# Patient Record
Sex: MALE | Race: WHITE | NOT HISPANIC OR LATINO | URBAN - METROPOLITAN AREA
[De-identification: names, ages, dates, MRNs, and addresses within clinical notes are randomized per-mention and may not be internally consistent; named-entity substitution may affect disease eponyms.]

---

## 2018-03-08 ENCOUNTER — RESOLUTE PROFESSIONAL BILLING HOSPITAL PROF FEE (OUTPATIENT)
Dept: HOSPITALIST | Facility: MEDICAL CENTER | Age: 43
End: 2018-03-08
Payer: COMMERCIAL

## 2018-03-08 ENCOUNTER — HOSPITAL ENCOUNTER (OUTPATIENT)
Dept: RADIOLOGY | Facility: MEDICAL CENTER | Age: 43
End: 2018-03-08

## 2018-03-08 ENCOUNTER — HOSPITAL ENCOUNTER (INPATIENT)
Facility: MEDICAL CENTER | Age: 43
LOS: 2 days | DRG: 280 | End: 2018-03-10
Attending: EMERGENCY MEDICINE | Admitting: HOSPITALIST
Payer: COMMERCIAL

## 2018-03-08 DIAGNOSIS — R07.9 CHEST PAIN, UNSPECIFIED TYPE: ICD-10-CM

## 2018-03-08 DIAGNOSIS — R79.89 ELEVATED TROPONIN: ICD-10-CM

## 2018-03-08 PROBLEM — J18.9 CAP (COMMUNITY ACQUIRED PNEUMONIA): Status: ACTIVE | Noted: 2018-03-08

## 2018-03-08 PROBLEM — E66.3 OVERWEIGHT: Status: ACTIVE | Noted: 2018-03-08

## 2018-03-08 PROBLEM — I21.4 NSTEMI (NON-ST ELEVATED MYOCARDIAL INFARCTION) (HCC): Status: ACTIVE | Noted: 2018-03-08

## 2018-03-08 LAB
BNP SERPL-MCNC: 44 PG/ML (ref 0–100)
EKG IMPRESSION: NORMAL
EST. AVERAGE GLUCOSE BLD GHB EST-MCNC: 97 MG/DL
HBA1C MFR BLD: 5 % (ref 0–5.6)
TROPONIN I SERPL-MCNC: 0.72 NG/ML (ref 0–0.04)

## 2018-03-08 PROCEDURE — 83036 HEMOGLOBIN GLYCOSYLATED A1C: CPT

## 2018-03-08 PROCEDURE — 99285 EMERGENCY DEPT VISIT HI MDM: CPT

## 2018-03-08 PROCEDURE — 700111 HCHG RX REV CODE 636 W/ 250 OVERRIDE (IP): Performed by: HOSPITALIST

## 2018-03-08 PROCEDURE — 83880 ASSAY OF NATRIURETIC PEPTIDE: CPT

## 2018-03-08 PROCEDURE — 36415 COLL VENOUS BLD VENIPUNCTURE: CPT

## 2018-03-08 PROCEDURE — 770020 HCHG ROOM/CARE - TELE (206)

## 2018-03-08 PROCEDURE — 93005 ELECTROCARDIOGRAM TRACING: CPT | Performed by: EMERGENCY MEDICINE

## 2018-03-08 PROCEDURE — 93005 ELECTROCARDIOGRAM TRACING: CPT

## 2018-03-08 PROCEDURE — 99223 1ST HOSP IP/OBS HIGH 75: CPT | Performed by: HOSPITALIST

## 2018-03-08 PROCEDURE — 700105 HCHG RX REV CODE 258: Performed by: HOSPITALIST

## 2018-03-08 PROCEDURE — A9270 NON-COVERED ITEM OR SERVICE: HCPCS | Performed by: EMERGENCY MEDICINE

## 2018-03-08 PROCEDURE — 700102 HCHG RX REV CODE 250 W/ 637 OVERRIDE(OP): Performed by: EMERGENCY MEDICINE

## 2018-03-08 PROCEDURE — 84484 ASSAY OF TROPONIN QUANT: CPT

## 2018-03-08 RX ORDER — ACETAMINOPHEN 325 MG/1
650 TABLET ORAL EVERY 6 HOURS PRN
Status: DISCONTINUED | OUTPATIENT
Start: 2018-03-08 | End: 2018-03-10 | Stop reason: HOSPADM

## 2018-03-08 RX ORDER — NITROGLYCERIN 0.4 MG/1
0.4 TABLET SUBLINGUAL
Status: DISCONTINUED | OUTPATIENT
Start: 2018-03-08 | End: 2018-03-10 | Stop reason: HOSPADM

## 2018-03-08 RX ORDER — CLONIDINE HYDROCHLORIDE 0.1 MG/1
0.1 TABLET ORAL EVERY 6 HOURS PRN
Status: DISCONTINUED | OUTPATIENT
Start: 2018-03-08 | End: 2018-03-10 | Stop reason: HOSPADM

## 2018-03-08 RX ORDER — ASPIRIN 300 MG/1
300 SUPPOSITORY RECTAL DAILY
Status: DISCONTINUED | OUTPATIENT
Start: 2018-03-09 | End: 2018-03-10

## 2018-03-08 RX ORDER — ASPIRIN 325 MG
325 TABLET ORAL DAILY
Status: DISCONTINUED | OUTPATIENT
Start: 2018-03-09 | End: 2018-03-10

## 2018-03-08 RX ORDER — AMOXICILLIN 250 MG
2 CAPSULE ORAL 2 TIMES DAILY
Status: DISCONTINUED | OUTPATIENT
Start: 2018-03-08 | End: 2018-03-10 | Stop reason: HOSPADM

## 2018-03-08 RX ORDER — POLYETHYLENE GLYCOL 3350 17 G/17G
1 POWDER, FOR SOLUTION ORAL
Status: DISCONTINUED | OUTPATIENT
Start: 2018-03-08 | End: 2018-03-10 | Stop reason: HOSPADM

## 2018-03-08 RX ORDER — BISACODYL 10 MG
10 SUPPOSITORY, RECTAL RECTAL
Status: DISCONTINUED | OUTPATIENT
Start: 2018-03-08 | End: 2018-03-10 | Stop reason: HOSPADM

## 2018-03-08 RX ORDER — PROMETHAZINE HYDROCHLORIDE 25 MG/1
12.5-25 TABLET ORAL EVERY 4 HOURS PRN
Status: DISCONTINUED | OUTPATIENT
Start: 2018-03-08 | End: 2018-03-10 | Stop reason: HOSPADM

## 2018-03-08 RX ORDER — ONDANSETRON 4 MG/1
4 TABLET, ORALLY DISINTEGRATING ORAL EVERY 4 HOURS PRN
Status: DISCONTINUED | OUTPATIENT
Start: 2018-03-08 | End: 2018-03-10 | Stop reason: HOSPADM

## 2018-03-08 RX ORDER — MORPHINE SULFATE 4 MG/ML
2-4 INJECTION, SOLUTION INTRAMUSCULAR; INTRAVENOUS
Status: DISCONTINUED | OUTPATIENT
Start: 2018-03-08 | End: 2018-03-10 | Stop reason: HOSPADM

## 2018-03-08 RX ORDER — PROMETHAZINE HYDROCHLORIDE 25 MG/1
12.5-25 SUPPOSITORY RECTAL EVERY 4 HOURS PRN
Status: DISCONTINUED | OUTPATIENT
Start: 2018-03-08 | End: 2018-03-10 | Stop reason: HOSPADM

## 2018-03-08 RX ORDER — ONDANSETRON 2 MG/ML
4 INJECTION INTRAMUSCULAR; INTRAVENOUS EVERY 4 HOURS PRN
Status: DISCONTINUED | OUTPATIENT
Start: 2018-03-08 | End: 2018-03-10 | Stop reason: HOSPADM

## 2018-03-08 RX ORDER — SODIUM CHLORIDE 9 MG/ML
INJECTION, SOLUTION INTRAVENOUS CONTINUOUS
Status: DISCONTINUED | OUTPATIENT
Start: 2018-03-08 | End: 2018-03-10

## 2018-03-08 RX ORDER — ASPIRIN 81 MG/1
324 TABLET, CHEWABLE ORAL DAILY
Status: DISCONTINUED | OUTPATIENT
Start: 2018-03-09 | End: 2018-03-10

## 2018-03-08 RX ORDER — ATORVASTATIN CALCIUM 40 MG/1
40 TABLET, FILM COATED ORAL EVERY EVENING
Status: DISCONTINUED | OUTPATIENT
Start: 2018-03-08 | End: 2018-03-10

## 2018-03-08 RX ORDER — ZOLPIDEM TARTRATE 5 MG/1
5 TABLET ORAL
Status: DISCONTINUED | OUTPATIENT
Start: 2018-03-08 | End: 2018-03-10 | Stop reason: HOSPADM

## 2018-03-08 RX ORDER — VALACYCLOVIR HYDROCHLORIDE 500 MG/1
500 TABLET, FILM COATED ORAL 2 TIMES DAILY
COMMUNITY
Start: 2018-03-06

## 2018-03-08 RX ORDER — LEVOFLOXACIN 5 MG/ML
750 INJECTION, SOLUTION INTRAVENOUS EVERY 24 HOURS
Status: DISCONTINUED | OUTPATIENT
Start: 2018-03-09 | End: 2018-03-09

## 2018-03-08 RX ORDER — LISINOPRIL 5 MG/1
5 TABLET ORAL
Status: DISCONTINUED | OUTPATIENT
Start: 2018-03-09 | End: 2018-03-10 | Stop reason: HOSPADM

## 2018-03-08 RX ADMIN — NITROGLYCERIN 1 INCH: 20 OINTMENT TOPICAL at 19:42

## 2018-03-08 RX ADMIN — SODIUM CHLORIDE: 9 INJECTION, SOLUTION INTRAVENOUS at 23:34

## 2018-03-08 RX ADMIN — MORPHINE SULFATE 4 MG: 4 INJECTION INTRAVENOUS at 23:59

## 2018-03-08 ASSESSMENT — LIFESTYLE VARIABLES
ON A TYPICAL DAY WHEN YOU DRINK ALCOHOL HOW MANY DRINKS DO YOU HAVE: 2
EVER HAD A DRINK FIRST THING IN THE MORNING TO STEADY YOUR NERVES TO GET RID OF A HANGOVER: NO
CONSUMPTION TOTAL: NEGATIVE
HOW MANY TIMES IN THE PAST YEAR HAVE YOU HAD 5 OR MORE DRINKS IN A DAY: 0
DO YOU DRINK ALCOHOL: YES
HAVE PEOPLE ANNOYED YOU BY CRITICIZING YOUR DRINKING: NO
HAVE YOU EVER FELT YOU SHOULD CUT DOWN ON YOUR DRINKING: NO
EVER_SMOKED: YES
AVERAGE NUMBER OF DAYS PER WEEK YOU HAVE A DRINK CONTAINING ALCOHOL: 2
TOTAL SCORE: 0
TOTAL SCORE: 0
EVER FELT BAD OR GUILTY ABOUT YOUR DRINKING: NO
TOTAL SCORE: 0

## 2018-03-08 ASSESSMENT — COGNITIVE AND FUNCTIONAL STATUS - GENERAL
MOBILITY SCORE: 23
SUGGESTED CMS G CODE MODIFIER DAILY ACTIVITY: CH
DAILY ACTIVITIY SCORE: 24
SUGGESTED CMS G CODE MODIFIER MOBILITY: CI
CLIMB 3 TO 5 STEPS WITH RAILING: A LITTLE

## 2018-03-08 ASSESSMENT — PATIENT HEALTH QUESTIONNAIRE - PHQ9
1. LITTLE INTEREST OR PLEASURE IN DOING THINGS: NOT AT ALL
SUM OF ALL RESPONSES TO PHQ QUESTIONS 1-9: 0
2. FEELING DOWN, DEPRESSED, IRRITABLE, OR HOPELESS: NOT AT ALL
SUM OF ALL RESPONSES TO PHQ9 QUESTIONS 1 AND 2: 0

## 2018-03-08 ASSESSMENT — ENCOUNTER SYMPTOMS
GASTROINTESTINAL NEGATIVE: 1
SHORTNESS OF BREATH: 1
PSYCHIATRIC NEGATIVE: 1
EYES NEGATIVE: 1
CONSTITUTIONAL NEGATIVE: 1
NEUROLOGICAL NEGATIVE: 1
MUSCULOSKELETAL NEGATIVE: 1

## 2018-03-08 ASSESSMENT — PAIN SCALES - GENERAL
PAINLEVEL_OUTOF10: 0
PAINLEVEL_OUTOF10: 6

## 2018-03-09 ENCOUNTER — APPOINTMENT (OUTPATIENT)
Dept: RADIOLOGY | Facility: MEDICAL CENTER | Age: 43
DRG: 280 | End: 2018-03-09
Attending: HOSPITALIST
Payer: COMMERCIAL

## 2018-03-09 LAB
ANION GAP SERPL CALC-SCNC: 8 MMOL/L (ref 0–11.9)
BASOPHILS # BLD AUTO: 0.5 % (ref 0–1.8)
BASOPHILS # BLD: 0.05 K/UL (ref 0–0.12)
BUN SERPL-MCNC: 11 MG/DL (ref 8–22)
CALCIUM SERPL-MCNC: 8.8 MG/DL (ref 8.5–10.5)
CHLORIDE SERPL-SCNC: 108 MMOL/L (ref 96–112)
CHOLEST SERPL-MCNC: 150 MG/DL (ref 100–199)
CO2 SERPL-SCNC: 21 MMOL/L (ref 20–33)
CREAT SERPL-MCNC: 0.97 MG/DL (ref 0.5–1.4)
EKG IMPRESSION: NORMAL
EOSINOPHIL # BLD AUTO: 0.03 K/UL (ref 0–0.51)
EOSINOPHIL NFR BLD: 0.3 % (ref 0–6.9)
ERYTHROCYTE [DISTWIDTH] IN BLOOD BY AUTOMATED COUNT: 45 FL (ref 35.9–50)
GLUCOSE SERPL-MCNC: 106 MG/DL (ref 65–99)
HCT VFR BLD AUTO: 39.1 % (ref 42–52)
HDLC SERPL-MCNC: 19 MG/DL
HGB BLD-MCNC: 13 G/DL (ref 14–18)
IMM GRANULOCYTES # BLD AUTO: 0.1 K/UL (ref 0–0.11)
IMM GRANULOCYTES NFR BLD AUTO: 1 % (ref 0–0.9)
LDLC SERPL CALC-MCNC: 98 MG/DL
LYMPHOCYTES # BLD AUTO: 1.36 K/UL (ref 1–4.8)
LYMPHOCYTES NFR BLD: 13 % (ref 22–41)
MCH RBC QN AUTO: 31.3 PG (ref 27–33)
MCHC RBC AUTO-ENTMCNC: 33.2 G/DL (ref 33.7–35.3)
MCV RBC AUTO: 94 FL (ref 81.4–97.8)
MONOCYTES # BLD AUTO: 0.63 K/UL (ref 0–0.85)
MONOCYTES NFR BLD AUTO: 6 % (ref 0–13.4)
NEUTROPHILS # BLD AUTO: 8.29 K/UL (ref 1.82–7.42)
NEUTROPHILS NFR BLD: 79.2 % (ref 44–72)
NRBC # BLD AUTO: 0 K/UL
NRBC BLD-RTO: 0 /100 WBC
PLATELET # BLD AUTO: 249 K/UL (ref 164–446)
PMV BLD AUTO: 9.9 FL (ref 9–12.9)
POTASSIUM SERPL-SCNC: 4.1 MMOL/L (ref 3.6–5.5)
PROCALCITONIN SERPL-MCNC: 0.26 NG/ML
RBC # BLD AUTO: 4.16 M/UL (ref 4.7–6.1)
SODIUM SERPL-SCNC: 137 MMOL/L (ref 135–145)
TRIGL SERPL-MCNC: 165 MG/DL (ref 0–149)
TROPONIN I SERPL-MCNC: 0.29 NG/ML (ref 0–0.04)
TROPONIN I SERPL-MCNC: 0.45 NG/ML (ref 0–0.04)
WBC # BLD AUTO: 10.5 K/UL (ref 4.8–10.8)

## 2018-03-09 PROCEDURE — 99232 SBSQ HOSP IP/OBS MODERATE 35: CPT | Mod: GC | Performed by: INTERNAL MEDICINE

## 2018-03-09 PROCEDURE — 80061 LIPID PANEL: CPT

## 2018-03-09 PROCEDURE — 700102 HCHG RX REV CODE 250 W/ 637 OVERRIDE(OP): Performed by: HOSPITALIST

## 2018-03-09 PROCEDURE — 93005 ELECTROCARDIOGRAM TRACING: CPT | Performed by: INTERNAL MEDICINE

## 2018-03-09 PROCEDURE — 80048 BASIC METABOLIC PNL TOTAL CA: CPT

## 2018-03-09 PROCEDURE — 36415 COLL VENOUS BLD VENIPUNCTURE: CPT

## 2018-03-09 PROCEDURE — 84145 PROCALCITONIN (PCT): CPT

## 2018-03-09 PROCEDURE — 93306 TTE W/DOPPLER COMPLETE: CPT

## 2018-03-09 PROCEDURE — A9270 NON-COVERED ITEM OR SERVICE: HCPCS | Performed by: HOSPITALIST

## 2018-03-09 PROCEDURE — 770020 HCHG ROOM/CARE - TELE (206)

## 2018-03-09 PROCEDURE — 99233 SBSQ HOSP IP/OBS HIGH 50: CPT | Performed by: HOSPITALIST

## 2018-03-09 PROCEDURE — 84484 ASSAY OF TROPONIN QUANT: CPT | Mod: 91

## 2018-03-09 PROCEDURE — 85025 COMPLETE CBC W/AUTO DIFF WBC: CPT

## 2018-03-09 PROCEDURE — A9502 TC99M TETROFOSMIN: HCPCS

## 2018-03-09 PROCEDURE — 700111 HCHG RX REV CODE 636 W/ 250 OVERRIDE (IP)

## 2018-03-09 PROCEDURE — 700111 HCHG RX REV CODE 636 W/ 250 OVERRIDE (IP): Performed by: HOSPITALIST

## 2018-03-09 PROCEDURE — 700105 HCHG RX REV CODE 258: Performed by: HOSPITALIST

## 2018-03-09 PROCEDURE — 93306 TTE W/DOPPLER COMPLETE: CPT | Mod: 26 | Performed by: INTERNAL MEDICINE

## 2018-03-09 PROCEDURE — 93010 ELECTROCARDIOGRAM REPORT: CPT | Performed by: INTERNAL MEDICINE

## 2018-03-09 RX ORDER — REGADENOSON 0.08 MG/ML
INJECTION, SOLUTION INTRAVENOUS
Status: COMPLETED
Start: 2018-03-09 | End: 2018-03-09

## 2018-03-09 RX ORDER — LEVOFLOXACIN 750 MG/1
750 TABLET, FILM COATED ORAL DAILY
Status: DISCONTINUED | OUTPATIENT
Start: 2018-03-09 | End: 2018-03-10 | Stop reason: HOSPADM

## 2018-03-09 RX ADMIN — SODIUM CHLORIDE: 9 INJECTION, SOLUTION INTRAVENOUS at 11:23

## 2018-03-09 RX ADMIN — ENOXAPARIN SODIUM 40 MG: 100 INJECTION SUBCUTANEOUS at 08:19

## 2018-03-09 RX ADMIN — LISINOPRIL 5 MG: 5 TABLET ORAL at 08:19

## 2018-03-09 RX ADMIN — ATORVASTATIN CALCIUM 40 MG: 40 TABLET, FILM COATED ORAL at 20:34

## 2018-03-09 RX ADMIN — METOPROLOL TARTRATE 25 MG: 25 TABLET, FILM COATED ORAL at 20:34

## 2018-03-09 RX ADMIN — METOPROLOL TARTRATE 25 MG: 25 TABLET, FILM COATED ORAL at 08:20

## 2018-03-09 RX ADMIN — REGADENOSON 0.4 MG: 0.08 INJECTION, SOLUTION INTRAVENOUS at 13:56

## 2018-03-09 RX ADMIN — ASPIRIN 325 MG: 325 TABLET ORAL at 08:19

## 2018-03-09 RX ADMIN — LEVOFLOXACIN 750 MG: 750 TABLET, FILM COATED ORAL at 08:19

## 2018-03-09 ASSESSMENT — ENCOUNTER SYMPTOMS
SHORTNESS OF BREATH: 0
BACK PAIN: 0
BRUISES/BLEEDS EASILY: 0
FLANK PAIN: 0
ORTHOPNEA: 0
CONSTITUTIONAL NEGATIVE: 1
COUGH: 0
SHORTNESS OF BREATH: 1
NERVOUS/ANXIOUS: 0
PALPITATIONS: 0
FEVER: 0
NAUSEA: 0
DEPRESSION: 0
DIZZINESS: 0
WEIGHT LOSS: 0
GASTROINTESTINAL NEGATIVE: 1
VOMITING: 0
MYALGIAS: 0
DIAPHORESIS: 0
ABDOMINAL PAIN: 0
PSYCHIATRIC NEGATIVE: 1
SPUTUM PRODUCTION: 0
CARDIOVASCULAR NEGATIVE: 1
LOSS OF CONSCIOUSNESS: 0
NEUROLOGICAL NEGATIVE: 1
CHILLS: 0
PND: 0
MUSCULOSKELETAL NEGATIVE: 1
WEAKNESS: 0

## 2018-03-09 ASSESSMENT — PAIN SCALES - GENERAL
PAINLEVEL_OUTOF10: 2
PAINLEVEL_OUTOF10: 0

## 2018-03-09 NOTE — ED NOTES
Called and spoke with lab regarding pt's labs not resulting yet. Was told it may take another 30 minutes.

## 2018-03-09 NOTE — CONSULTS
DATE OF SERVICE:  03/08/2018    REFERRING PHYSICIAN:  Dr. Sergei Bar, Dr. Farhan Estrada.    REASON FOR CONSULT:  Atypical chest pain, positive troponins.    HISTORY OF PRESENT ILLNESS:  Pleasant 42-year-old white male who was   transferred down from [facility] with atypical chest pain,   described as sharp, infraclavicular near the left side and mild shortness of   breath.  CT scan up in [place] showed that the great vessels were normal.    There was no evidence of pulmonary embolus.  Patient was recently in [place],   had pneumonia, received antibiotics, he also has some atypical chest pain   there, subsequently came back to [place] and had the chest pain today, had   fevers and chills while he was in [place], this resolved with antibiotics.  He   denies hypertension, denies diabetes.  There is some heart disease in the   family.  Some slight excess alcohol use and no smoking.    ALLERGIES:  HE HAS ALLERGIES TO PENICILLIN AND VANCOMYCIN.    OUTPATIENT MEDICATIONS:  Include Valtrex.  He was also on recent antibiotics.    SOCIAL HISTORY:  The patient is , works in real estate, no smoking,   some slight excess alcohol use.    PAST SURGICAL HISTORY:  None.    REVIEW OF SYSTEMS:  CONSTITUTIONAL:  Some fevers recently.  ENT:  Negative.  EYES:  Negative.  RESPIRATORY:  Some mild shortness of breath.  CARDIOVASCULAR:  Atypical chest pain.  GASTROINTESTINAL:  Negative.  GENITOURINARY:  Negative.  MUSCULOSKELETAL:  Negative.  SKIN:  Negative.  NEUROLOGICAL:  Negative.  ENDOCRINE:  Negative.  HEMATOLOGIC:  Negative.  ALLERGIES:  Negative.  PSYCHIATRIC/BEHAVIORAL:  Negative.    PHYSICAL EXAMINATION:  GENERAL:  White male, in no acute distress.  VITAL SIGNS:  Blood pressure 134/97, pulse is 95, respirations 16, he is   afebrile.  CONSTITUTIONAL:  He is alert and oriented x3.  HEENT:  Head is normocephalic, atraumatic.  Eyes, pupils equal, round,   reactive to light and accommodation.  NECK:  Supple, normal  range.  CARDIOVASCULAR:  Regular rate and rhythm, normal S1, S2, without murmurs, rubs   or gallops.  PULMONARY:  Few crackles, otherwise clear.  ABDOMEN:  Soft, nontender without hepatosplenomegaly.  MUSCULOSKELETAL:  Normal range of motion.  LYMPHADENOPATHY:  None.  NEUROLOGIC:  Alert and oriented x3.  SKIN:  Turgor is normal.  PSYCHIATRIC:  Normal.    LABORATORY DATA:  Troponin was 0.72.  BNP of 44.  Electrocardiogram shows   sinus rhythm, some artifact inferiorly, otherwise a fairly unremarkable EKG,   EKG from [facility] also was unremarkable.  Labs from [facility] showed   hematocrit of 43.9, platelet count 272,000, white count 6.7, hemoglobin 15.    CMP unremarkable except for glucose of 119.  Troponin 0.75.  BNP 62.  Chest   x-ray shows small left lower lobe pleural effusion, left lower lobe   atelectasis versus pneumonia, some ground glass opacities, mild cardiomegaly   without pericardial effusion, no evidence of pulmonary embolus.    ASSESSMENT:  Chest pain, very atypical, sharp, worse with inspiration with   borderline troponins of 0.7.    RECOMMENDATIONS:  1.  Continuing troponin levels, noninvasive workup including possible   Persantine thallium in the morning.  2.  Recent pneumonia.  3.  Atypical chest pain.  4.  Borderline troponins.  5.  Check echocardiogram.       ____________________________________     KEIRA MACK MD    SHEREE / NTS    DD:  03/08/2018 22:33:31  DT:  03/09/2018 01:43:37    D#:  [id number]  Job#:  [id number]

## 2018-03-09 NOTE — PROGRESS NOTES
Pt arrived to room 837 via gurney from emergency room. Pt oriented to room and use of call light, pt verbalized understanding.     Pt A&O x4. Pt currently on room air. Pt c/o pain to left shoulder, see mar for details. Assessment completed.

## 2018-03-09 NOTE — ED PROVIDER NOTES
"ED Provider Note    Scribed for Farhan Estrada M.D. by Loi Wadsworth. 3/8/2018  7:27 PM    Primary care provider: No primary care provider on file.  Means of arrival: Ambulance  History obtained from: Patient  History limited by: None    CHIEF COMPLAINT  Chief Complaint   Patient presents with   • Chest Pain   • Abnormal Labs       HPI  Sriram Carlton is a 42 y.o. male who presents to the Emergency Department as a transfer from [facility] with complaints of chest pain pain onset 7 hours ago. The patient states he was at his office today and he had a sharp pain underneath his left clavicle. He admits that he \"felt like he broke his shoulder\". He states this pain was of sudden onset and did not experience any other symptoms. Patient also notes he was recently in [place] on a ski trip last Saturday when he felt a sharp pain around his left rib region. The patient developed a sudden fever after this rib pain and had a change in his appetite. He was later seen for these symptoms where a chest x-ray was performed which showed he had pneumonia. He was treated for this and was later released. He had no other symptoms until this afternoon. Patient denies any alleviating factors. He denies shortness of breath, fevers, chills, nausea.     REVIEW OF SYSTEMS  Pertinent positives include chest pain. Pertinent negatives include no shortness of breath, fevers, chills, nausea.  All other systems reviewed and negative.     C.     PAST MEDICAL HISTORY   Recent history of pneumonia.     SURGICAL HISTORY  patient denies any surgical history    SOCIAL HISTORY  Social History   Substance Use Topics   • Smoking status: None noted   • Smokeless tobacco: None noted   • Alcohol use None noted      History   Drug use: None noted       FAMILY HISTORY  No family history noted      CURRENT MEDICATIONS  Home Medications     Reviewed by Layla Cyr, Pharmacy Intern (Pharmacy Intern) on 03/08/18 at 2032  Med List Status: Partial " "  Medication Last Dose Status   valACYclovir (VALTREX) 500 MG Tab 3/8/2018 Active                ALLERGIES  Allergies   Allergen Reactions   • Penicillin G      Childhood rxn   • Vancomycin Hives and Rash     Unspecified       PHYSICAL EXAM  VITAL SIGNS: /97   Pulse 96   Temp 36.3 °C (97.3 °F)   Resp 16   Ht 1.88 m (6' 2\")   Wt 99.8 kg (220 lb)   SpO2 94%   BMI 28.25 kg/m²     Constitutional: Well developed, Well nourished, mild distress, Non-toxic appearance.   HENT: Normocephalic, Atraumatic, Bilateral external ears normal, Oropharynx moist, No oral exudates.   Eyes: PERRLA, EOMI, Conjunctiva normal, No discharge.   Neck: No tenderness, Supple, No stridor.   Lymphatic: No lymphadenopathy noted.   Cardiovascular: Normal heart rate, Normal rhythm.   Thorax & Lungs: Clear to auscultation bilaterally, No respiratory distress, No wheezing, No crackles.   Abdomen: Soft, No tenderness, No masses, No pulsatile masses.   Skin: Warm, Dry, No erythema, No rash.   Extremities:, No edema No cyanosis.   Musculoskeletal: No major deformities noted.  Intact distal pulses. Slight tenderness to palpation on the superior and anterior chest wall  Neurologic: Awake, alert. Moves all extremities spontaneously.  Psychiatric: Affect normal, Judgment normal, Mood normal.     LABS  Results for orders placed or performed during the hospital encounter of 03/08/18   Troponin STAT   Result Value Ref Range    Troponin I 0.72 (H) 0.00 - 0.04 ng/mL   Btype Natriuretic Peptide   Result Value Ref Range    B Natriuretic Peptide 44 0 - 100 pg/mL   Hemoglobin A1c   Result Value Ref Range    Glycohemoglobin 5.0 0.0 - 5.6 %    Est Avg Glucose 97 mg/dL   EKG (NOW)   Result Value Ref Range    Report       [facility] Emergency [facility]    Test Date:  2018-03-08  Pt Name:    KEIRA MEJIA                Department: ER  MRN:        [id number]                      Room:       RD 10  Gender:     Male                         " Technician: [id number]  :        1975                   Requested By:ER TRIAGE PROTOCOL  Order #:    [id number]                    Reading MD: EMMA BRADY MD    Measurements  Intervals                                Axis  Rate:       88                           P:          23  VA:         156                          QRS:        8  QRSD:       92                           T:          43  QT:         380  QTc:        460    Interpretive Statements  SINUS RHYTHM  No previous ECG available for comparison    Electronically Signed On 3-8-2018 19:38:53 PST by EMMA BRADY MD         RADIOLOGY  CT-FOREIGN FILM CAT SCAN   Final Result      LE Venous Duplex-DVT ([facility] only)    (Results Pending)   Echocardiogram Comp W/O Cont    (Results Pending)     The radiologist's interpretation of all radiological studies have been reviewed by me.    COURSE & MEDICAL DECISION MAKING  Pertinent Labs & Imaging studies reviewed. (See chart for details)    I reviewed the patient's medical records which showed that the patient is a transfer from [facility] for shoulder pain and chest pain pleuritic. They ordered 2 EKGs which were non-specific. The patient has a troponin level of 0.75. A repeat troponin was 0.88. They performed a CT/PE study which did not show a PE. He did have a proximal left lower lobe pneumonia. He was treated with Levaquin for 5 days for this pneumonia.     7:27 PM - Patient seen and examined at bedside. Patient will be treated with Nitro-BID ointment 1 Inch. Ordered Troponin STAT, BNP, EKG to evaluate his symptoms.     9:20 PM Paged Dr. Bar (hospitalist).     10:00 PM  I discussed the patient's case and the above findings with Dr. Bar (hospitalist) who would like to further consult for admission.     10:15 PM I discussed the patient's case and the above findings with Dr. Stone (Cardiologist) who agrees to admit the patient.       Decision Making:  Patient with moderately  elevated troponin, left sided chest pain, sounds like a pulmonary embolism however CT scan was negative, the patient received Lovenox, discussed the case with the hospitalist, they've recommended a consult with cardiologist.    DISPOSITION:  Patient will be admitted to Dr. Bar in guarded condition.      FINAL IMPRESSION  1. Chest pain, unspecified type    2. Elevated troponin          I, Loi Wadsworth (Scribe), am scribing for, and in the presence of, Farhan Estrada M.D..    Electronically signed by: Loi Wadsworth (Scribe), 3/8/2018    I, Farhan Estrada M.D. personally performed the services described in this documentation, as scribed by Loi Wadsworth in my presence, and it is both accurate and complete.    The note accurately reflects work and decisions made by me.  Farhan Estrada  3/8/2018  11:26 PM

## 2018-03-09 NOTE — ED TRIAGE NOTES
Pt arrived via EMS, transfer from [facility]. Per EMS pt presented to ED today c/o upper L chest pain radiating to shoulder. Pt described pain as sharp, stabbing. Pt also states SOB with cp.     Pt was dx with PNA on Sunday, placed on abx. Pt states last abx was taken today. Initial trop 0.77, increased to 0.88 at second lab draw. CTA of chest shows possible PNA.     Pt was given 1 g Rocephin IV, 100 mg lovenox, 325 mg ASA PO, 30 mg IV toradol, and approx. 1700 cc NS PTA.     Pt denies pain at this time. A/o x4, speaking in full sentences.

## 2018-03-09 NOTE — ASSESSMENT & PLAN NOTE
Status post 5 days of antibiotic therapy.  Pro calcitonin remains elevated.  He is starting to feel better and clinically improve back on the antibiotics. Continue the oral levofloxacin and monitor clinically.  Physiological stress from the pneumonia may have caused the increase in the troponin and perhaps may have caused a Takotsubo type syndrome.

## 2018-03-09 NOTE — PROGRESS NOTES
Skin assessment completed with 2 nurses Tiara and Julia SIMPSON.    Pt has bruise to right foot great toe. Pt hit toe during ski outing per pt.    Coccyx without redness, skin intact.  No other skin issues.

## 2018-03-09 NOTE — H&P
Hospital Medicine History and Physical    Date of Service  3/8/2018    Chief Complaint  Chief Complaint   Patient presents with   • Chest Pain   • Abnormal Labs       History of Presenting Illness  42 y.o. male without prior medical history who was in his usual state of health until approximately one week prior to admission. While in [place] on a skiing trip, he developed shortness of breath, and was evaluated at the clinic, diagnosed with pneumonia. He was subsequently started on levofloxacin, which reversed all of his symptoms. On the day of admission, however, he developed severe 8 out of 10 sharp pain to his left chest. This was nonradiating, and not associated with exacerbating or relieving factors. He then presented to a hospital in [place], where he was given pain medication with some relief of the pain. He was noted to have elevated troponin at that time, and was subsequently transferred to this facility for further evaluation. He currently reports that his chest pain is ongoing, and now associated with shortness of breath. He feels that is worsening.    Primary Care Physician  Pcp Not In Computer    Consultants  Cardiology    Code Status  Full code    Review of Systems  Review of Systems   Constitutional: Negative.    HENT: Negative.    Eyes: Negative.    Respiratory: Positive for shortness of breath.    Cardiovascular: Positive for chest pain.   Gastrointestinal: Negative.    Genitourinary: Negative.    Musculoskeletal: Negative.    Skin: Negative.    Neurological: Negative.    Endo/Heme/Allergies: Negative.    Psychiatric/Behavioral: Negative.         Past Medical History  No past medical history on file.    Surgical History  No past surgical history on file.    Medications  No current facility-administered medications on file prior to encounter.      No current outpatient prescriptions on file prior to encounter.       Family History  Family History   Problem Relation Age of Onset   • Heart Disease  Father    • Hypertension Father        Social History  Social History   Substance Use Topics   • Smoking status: Never Smoker   • Smokeless tobacco: Never Used   • Alcohol use Yes      Comment: daily drinker        Allergies  Allergies   Allergen Reactions   • Penicillin G      Childhood rxn   • Vancomycin Hives and Rash     Unspecified        Physical Exam  Laboratory   Hemodynamics  Temp (24hrs), Av.3 °C (97.3 °F), Min:36.3 °C (97.3 °F), Max:36.3 °C (97.3 °F)   Temperature: 36.3 °C (97.3 °F)  Pulse  Av.4  Min: 92  Max: 99 Heart Rate (Monitored): 95  Blood Pressure: 134/97, NIBP: 131/88      Respiratory      Respiration: 16, Pulse Oximetry: 93 %             Physical Exam   Constitutional: He is oriented to person, place, and time. He appears well-developed and well-nourished. No distress.   HENT:   Head: Normocephalic.   Eyes: Pupils are equal, round, and reactive to light.   Neck: Normal range of motion. Neck supple. No tracheal deviation present. No thyromegaly present.   Cardiovascular: Regular rhythm and normal heart sounds.  Exam reveals no gallop and no friction rub.    No murmur heard.  Pulmonary/Chest: Effort normal and breath sounds normal. No respiratory distress. He has no wheezes.   Abdominal: Soft. Bowel sounds are normal. He exhibits no distension and no mass. There is no tenderness. There is no rebound and no guarding.   Musculoskeletal: Normal range of motion. He exhibits no edema.   Lymphadenopathy:     He has no cervical adenopathy.   Neurological: He is alert and oriented to person, place, and time. No cranial nerve deficit.   Skin: He is not diaphoretic.   Psychiatric: He has a normal mood and affect.   Nursing note and vitals reviewed.              No results for input(s): ALTSGPT, ASTSGOT, ALKPHOSPHAT, TBILIRUBIN, DBILIRUBIN, GAMMAGT, AMYLASE, LIPASE, ALB, PREALBUMIN, GLUCOSE in the last 72 hours.      Recent Labs      18   BNPBTYPENAT  44         Lab Results   Component  Value Date    TROPONINI 0.72 (H) 03/08/2018     Urinalysis:  No results found for: SPECGRAVITY, GLUCOSEUR, KETONES, NITRITE, WBCURINE, RBCURINE, BACTERIA, EPITHELCELL     Imaging  Essentially normal computed tomographic angiogram of the chest from outside facility    Assessment/Plan     I anticipate this patient will require at least two midnights for appropriate medical management, necessitating inpatient admission.    * NSTEMI (non-ST elevated myocardial infarction) (CMS-[facility])   Assessment & Plan    With elevated troponin, ongoing chest pain.  Given hypoxia and tachycardia also concern for possible PE,  However negative study at outside facility for the same.  Will trend troponin and appreciate cardiology recommendations.         Overweight   Assessment & Plan    Body mass index is 28.25 kg/m².          CAP (community acquired pneumonia)   Assessment & Plan    Status post 5 days of antibiotic therapy.  Improved.  Monitor.             VTE prophylaxis: SCD, Lovenox.

## 2018-03-09 NOTE — PROGRESS NOTES
Bedside report received, pt updated on POC including NPO status after 0800 for NM - no complaints of pain at this time, call light in reach

## 2018-03-09 NOTE — ED NOTES
Med rec partially complete by interview with patient at bedside  Pt reported that he just finished a course of abx for PNA today but unable to verify that with [facility]  Pt filled a Zpak in January, which is the last abx the pt has filled  Pt reported taking Valtrex for a cold sore starting on 3/6/18 and finished the course this AM  Allergies reviewed

## 2018-03-09 NOTE — ED NOTES
Receiving RN here to transfer pt to T837. All belongings accounted for with pt for transfer. No further questions at this time.

## 2018-03-09 NOTE — CARE PLAN
Problem: Venous Thromboembolism (VTW)/Deep Vein Thrombosis (DVT) Prevention:  Goal: Patient will participate in Venous Thrombosis (VTE)/Deep Vein Thrombosis (DVT)Prevention Measures  Outcome: PROGRESSING AS EXPECTED  Educated on VTE lovenox prophylaxis, verbalizes understanding     Problem: Mobility  Goal: Risk for activity intolerance will decrease  Outcome: PROGRESSING AS EXPECTED  Steady on feet, educated about fall precautions

## 2018-03-09 NOTE — ASSESSMENT & PLAN NOTE
With elevated troponin, improved but still elevated   Hypoxia has improved.  Continue to monitor troponin.  Continue aspirin, atenolol, atorvastatin.  Nuclear stress test is negative for evidence of ischemia. Need echocardiogram to rule out other causes of elevated troponin, no evidence of pericarditis on EKG or lab work other than elevated troponin.

## 2018-03-10 VITALS
WEIGHT: 226.63 LBS | DIASTOLIC BLOOD PRESSURE: 76 MMHG | HEIGHT: 74 IN | BODY MASS INDEX: 29.09 KG/M2 | OXYGEN SATURATION: 97 % | TEMPERATURE: 98 F | SYSTOLIC BLOOD PRESSURE: 121 MMHG | RESPIRATION RATE: 18 BRPM | HEART RATE: 74 BPM

## 2018-03-10 LAB
LV EJECT FRACT  99904: 60
LV EJECT FRACT MOD 2C 99903: 54.83
LV EJECT FRACT MOD 4C 99902: 53.97
LV EJECT FRACT MOD BP 99901: 55.5
TROPONIN I SERPL-MCNC: 0.09 NG/ML (ref 0–0.04)

## 2018-03-10 PROCEDURE — 700105 HCHG RX REV CODE 258: Performed by: HOSPITALIST

## 2018-03-10 PROCEDURE — 84484 ASSAY OF TROPONIN QUANT: CPT

## 2018-03-10 PROCEDURE — 94760 N-INVAS EAR/PLS OXIMETRY 1: CPT

## 2018-03-10 PROCEDURE — 99232 SBSQ HOSP IP/OBS MODERATE 35: CPT | Mod: GC | Performed by: INTERNAL MEDICINE

## 2018-03-10 PROCEDURE — 700111 HCHG RX REV CODE 636 W/ 250 OVERRIDE (IP): Performed by: HOSPITALIST

## 2018-03-10 PROCEDURE — A9270 NON-COVERED ITEM OR SERVICE: HCPCS | Performed by: HOSPITALIST

## 2018-03-10 PROCEDURE — 36415 COLL VENOUS BLD VENIPUNCTURE: CPT

## 2018-03-10 PROCEDURE — 700102 HCHG RX REV CODE 250 W/ 637 OVERRIDE(OP): Performed by: HOSPITALIST

## 2018-03-10 PROCEDURE — 99239 HOSP IP/OBS DSCHRG MGMT >30: CPT | Performed by: HOSPITALIST

## 2018-03-10 RX ORDER — LISINOPRIL 5 MG/1
5 TABLET ORAL DAILY
Qty: 30 TAB | Refills: 1 | Status: SHIPPED | OUTPATIENT
Start: 2018-03-11

## 2018-03-10 RX ORDER — ATORVASTATIN CALCIUM 20 MG/1
20 TABLET, FILM COATED ORAL EVERY EVENING
Status: DISCONTINUED | OUTPATIENT
Start: 2018-03-10 | End: 2018-03-10 | Stop reason: HOSPADM

## 2018-03-10 RX ORDER — ATORVASTATIN CALCIUM 20 MG/1
20 TABLET, FILM COATED ORAL EVERY EVENING
Qty: 30 TAB | Refills: 1 | Status: SHIPPED | OUTPATIENT
Start: 2018-03-10

## 2018-03-10 RX ORDER — ASPIRIN 81 MG/1
81 TABLET ORAL DAILY
Qty: 30 TAB | Refills: 1 | Status: SHIPPED | OUTPATIENT
Start: 2018-03-10

## 2018-03-10 RX ORDER — LEVOFLOXACIN 750 MG/1
750 TABLET, FILM COATED ORAL DAILY
Qty: 10 TAB | Refills: 0 | Status: SHIPPED | OUTPATIENT
Start: 2018-03-11

## 2018-03-10 RX ADMIN — ENOXAPARIN SODIUM 40 MG: 100 INJECTION SUBCUTANEOUS at 08:23

## 2018-03-10 RX ADMIN — ASPIRIN 325 MG: 325 TABLET ORAL at 08:23

## 2018-03-10 RX ADMIN — METOPROLOL TARTRATE 25 MG: 25 TABLET, FILM COATED ORAL at 08:23

## 2018-03-10 RX ADMIN — SODIUM CHLORIDE: 9 INJECTION, SOLUTION INTRAVENOUS at 00:16

## 2018-03-10 RX ADMIN — LEVOFLOXACIN 750 MG: 750 TABLET, FILM COATED ORAL at 08:23

## 2018-03-10 RX ADMIN — LISINOPRIL 5 MG: 5 TABLET ORAL at 08:23

## 2018-03-10 ASSESSMENT — ENCOUNTER SYMPTOMS
SPUTUM PRODUCTION: 0
DIZZINESS: 0
FEVER: 0
DIAPHORESIS: 0
COUGH: 0
SHORTNESS OF BREATH: 0
ABDOMINAL PAIN: 0
HEADACHES: 0
PALPITATIONS: 0
PND: 0
WEAKNESS: 0
BLOOD IN STOOL: 0

## 2018-03-10 ASSESSMENT — PAIN SCALES - GENERAL
PAINLEVEL_OUTOF10: 0
PAINLEVEL_OUTOF10: 0

## 2018-03-10 ASSESSMENT — COPD QUESTIONNAIRES
HAVE YOU SMOKED AT LEAST 100 CIGARETTES IN YOUR ENTIRE LIFE: NO/DON'T KNOW
COPD SCREENING SCORE: 2
DURING THE PAST 4 WEEKS HOW MUCH DID YOU FEEL SHORT OF BREATH: SOME OF THE TIME
DO YOU EVER COUGH UP ANY MUCUS OR PHLEGM?: YES, A FEW DAYS A WEEK OR MONTH

## 2018-03-10 ASSESSMENT — LIFESTYLE VARIABLES: EVER_SMOKED: NEVER

## 2018-03-10 NOTE — PROGRESS NOTES
Bedside report received, no complaints of pain, safety measures in place, call light in reach, updated on POC

## 2018-03-10 NOTE — PROGRESS NOTES
Bedside report received from day shift nurse. Pt resting comfortably in bed, zero s/s of distress. Pt denies pain at this time. NS infusing at 83ml/hr per orders. Pt currently on room air.

## 2018-03-10 NOTE — CARE PLAN
Problem: Infection  Goal: Will remain free from infection  Outcome: PROGRESSING AS EXPECTED      Problem: Knowledge Deficit  Goal: Knowledge of disease process/condition, treatment plan, diagnostic tests, and medications will improve  Outcome: PROGRESSING AS EXPECTED  Educated on POC, verbalizes understanding

## 2018-03-10 NOTE — PROGRESS NOTES
[facility] Hospitalist Progress Note    Date of Service: 3/9/2018    Chief Complaint  42 y.o. male admitted 3/8/2018 with chest pain and elevated troponin, recent pneumonia diagnosis.    Interval Problem Update  The chest pain has not recurred. The patient still has a little bit of shortness of breath.    Consultants/Specialty  Cardiology    Disposition  Home when medically cleared        Review of Systems   Constitutional: Negative.  Negative for chills, fever, malaise/fatigue and weight loss.   HENT: Negative.    Respiratory: Positive for shortness of breath. Negative for cough.    Cardiovascular: Negative.  Negative for chest pain and leg swelling.   Gastrointestinal: Negative.  Negative for abdominal pain, nausea and vomiting.   Genitourinary: Negative.  Negative for dysuria and flank pain.   Musculoskeletal: Negative.  Negative for back pain and myalgias.   Neurological: Negative.  Negative for dizziness, loss of consciousness and weakness.   Endo/Heme/Allergies: Negative.  Does not bruise/bleed easily.   Psychiatric/Behavioral: Negative.  Negative for depression. The patient is not nervous/anxious.    All other systems reviewed and are negative.     Physical Exam  Laboratory/Imaging   Hemodynamics  Temp (24hrs), Av.7 °C (98 °F), Min:36.3 °C (97.3 °F), Max:37.3 °C (99.2 °F)   Temperature: 36.9 °C (98.4 °F)  Pulse  Av.1  Min: 78  Max: 103 Heart Rate (Monitored): 91  Blood Pressure: 115/74, NIBP: 126/90      Respiratory      Respiration: 16, Pulse Oximetry: 96 %        RUL Breath Sounds: Clear, RML Breath Sounds: Clear, RLL Breath Sounds: Clear, FABRIZIO Breath Sounds: Clear, LLL Breath Sounds: Clear    Fluids    Intake/Output Summary (Last 24 hours) at 18 1829  Last data filed at 18 1800   Gross per 24 hour   Intake              600 ml   Output             2250 ml   Net            -1650 ml       Nutrition  Orders Placed This Encounter   Procedures   • DIET ORDER     Standing Status:   Standing      Number of Occurrences:   1     Order Specific Question:   Diet:     Answer:   Regular [1]     Order Specific Question:   Miscellaneous modifications:     Answer:   No Decaf, No Caffeine(for test) [11]     Comments:   Protocol 1313 Patient to have no caffeine for 12 hours prior to exam (decaf, coffee, cola, tea, chocolate)     Physical Exam   Constitutional: He appears well-developed and well-nourished. No distress.   HENT:   Nose: Nose normal.   Mouth/Throat: Oropharynx is clear and moist. No oropharyngeal exudate.   Eyes: Conjunctivae are normal. Right eye exhibits no discharge. Left eye exhibits no discharge. No scleral icterus.   Neck: No JVD present. No tracheal deviation present.   Cardiovascular: Normal rate, regular rhythm and normal heart sounds.    Pulmonary/Chest: Effort normal and breath sounds normal. No stridor. No respiratory distress. He has no wheezes. He has no rales. He exhibits no tenderness.   Abdominal: Soft. Bowel sounds are normal. He exhibits no distension. There is no tenderness.   Musculoskeletal: He exhibits no edema or tenderness.   Neurological: He is alert. No cranial nerve deficit. He exhibits normal muscle tone.   Skin: Skin is warm and dry. He is not diaphoretic. No pallor.   Psychiatric: He has a normal mood and affect. His behavior is normal.   Nursing note and vitals reviewed.      Recent Labs      03/09/18 0219   WBC  10.5   RBC  4.16*   HEMOGLOBIN  13.0*   HEMATOCRIT  39.1*   MCV  94.0   MCH  31.3   MCHC  33.2*   RDW  45.0   PLATELETCT  249   MPV  9.9     Recent Labs      03/09/18 0219   SODIUM  137   POTASSIUM  4.1   CHLORIDE  108   CO2  21   GLUCOSE  106*   BUN  11   CREATININE  0.97   CALCIUM  8.8         Recent Labs      03/08/18   1944   BNPBTYPENAT  44     Recent Labs      03/09/18   0219   TRIGLYCERIDE  165*   HDL  19*   LDL  98          Assessment/Plan     * NSTEMI (non-ST elevated myocardial infarction) (CMS-[facility])   Assessment & Plan    With elevated troponin,  improved but still elevated   Hypoxia has improved.  Continue to monitor troponin.  Nuclear stress test is negative for evidence of ischemia. Need echocardiogram to rule out other causes of elevated troponin, no evidence of pericarditis on EKG or lab work other than elevated troponin.          Overweight   Assessment & Plan    Body mass index is 28.25 kg/m².          CAP (community acquired pneumonia)   Assessment & Plan    Status post 5 days of antibiotic therapy.  Pro calcitonin remains elevated.  He is starting to feel better and clinically improve back on the antibiotics. Continue the oral levofloxacin and monitor clinically.  Physiological stress from the pneumonia may have caused the increase in the troponin and perhaps may have caused a Takotsubo type syndrome.          Quality-Core Measures

## 2018-03-10 NOTE — PROGRESS NOTES
"Cardiology Progress Note                                      Admit Date: 3/8/2018  Resident(s): Carson Heath     Date & Time:   3/9/2018   4:32 PM       Patient ID:    Name:             Sriram Carlton     YOB: 1975  Age:                 42 y.o.  male   MRN:               [id number]    HPI:  Sriram Carlton is a 42 y.o. With no significant PMHx who was transferred down from [facility] with atypical chest pain,   described as sharp, infraclavicular near the left side and mild shortness of breath. CTPE in [place] were negative for PE. He has a recent diagnosis of CAP in [place] for which he was treated with antibiotics. On arrival at [facility], His troponin was elevated w/o any associated EKG changes.     Reason for consult:  - Elevated serum troponin.     Interval History:  - Interval improvement in chest pain quality. Troponin level trending down. No EKG changes so far.   - awaiting stress test and Echo today.     Review of Systems   Constitutional: Negative for diaphoresis, fever and malaise/fatigue.   Respiratory: Negative for cough, sputum production and shortness of breath.    Cardiovascular: Positive for chest pain. Negative for palpitations, orthopnea, leg swelling and PND.   Neurological: Negative for weakness.       Physical Exam   Blood pressure 115/74, pulse 93, temperature 36.9 °C (98.4 °F), resp. rate 16, height 1.88 m (6' 2\"), weight 102.8 kg (226 lb 10.1 oz), SpO2 96 %.  Constitutional: He appears well-developed.   HENT: Normocephalic and atraumatic. No scleral icterus.   Neck: No JVD present.   Cardiovascular: Normal rate. Exam reveals no gallop and no friction rub. No murmur heard.   Pulmonary/Chest: CTAB.   Abdominal: S/NT/ND BS+   Musculoskeletal: Exhibits no edema. Pulses present.  Skin: Skin is warm and dry.       Fluids:    Intake/Output Summary (Last 24 hours) at 03/09/18 1632  Last data filed at 03/09/18 1600   Gross per 24 hour   Intake              120 ml   Output         "     2250 ml   Net            -2130 ml       Date 03/09/18 0700 - 03/10/18 0659   Shift 5812-5087 1384-7729 4823-6664 24 Hour Total   I  N  T  A  K  E   P.O. 120   120      P.O. 120   120    Shift Total 120   120   O  U  T  P  U  T   Urine  1500  1500      Void (ml)  1500  1500    Shift Total  1500  1500    -1500  -1380          Weight: 102.8 kg (226 lb 10.1 oz)  Body mass index is 29.1 kg/m².    Recent Labs      03/09/18   0219   SODIUM  137   POTASSIUM  4.1   CHLORIDE  108   CO2  21   BUN  11   CREATININE  0.97   CALCIUM  8.8         Recent Labs      03/09/18 0219   WBC  10.5   RBC  4.16*   HEMOGLOBIN  13.0*   HEMATOCRIT  39.1*   MCV  94.0   MCH  31.3   MCHC  33.2*   RDW  45.0   PLATELETCT  249   MPV  9.9     Recent Labs      03/09/18   0219   SODIUM  137   POTASSIUM  4.1   CHLORIDE  108   CO2  21   GLUCOSE  106*   BUN  11   CREATININE  0.97   CALCIUM  8.8         Recent Labs      03/08/18   1944   BNPBTYPENAT  44     Recent Labs      03/08/18   1944  03/09/18 0219  03/09/18   0636   TROPONINI  0.72*  0.45*  0.29*   BNPBTYPENAT  44   --    --      Recent Labs      03/09/18 0219   TRIGLYCERIDE  165*   HDL  19*   LDL  98       Meds:  • enoxaparin (LOVENOX) injection  40 mg     • levoFLOXacin  750 mg     • NS   83 mL/hr at 03/09/18 1123   • acetaminophen  650 mg     • cloNIDine  0.1 mg     • ondansetron  4 mg     • ondansetron  4 mg     • promethazine  12.5-25 mg     • promethazine  12.5-25 mg     • prochlorperazine  5-10 mg     • senna-docusate  2 Tab      And   • polyethylene glycol/lytes  1 Packet      And   • magnesium hydroxide  30 mL      And   • bisacodyl  10 mg     • zolpidem  5 mg     • Respiratory Care per Protocol       • nitroglycerin  0.4 mg     • morphine injection  2-4 mg     • metoprolol  25 mg     • lisinopril  5 mg     • atorvastatin  40 mg     • aspirin  325 mg      Or   • aspirin  324 mg      Or   • aspirin  300 mg     • magnesium hydroxide  30 mL         Current Facility-Administered  Medications   Medication Dose Frequency Provider Last Rate Last Dose   • enoxaparin (LOVENOX) inj 40 mg  40 mg DAILY Sergei Bar M.D.   40 mg at 03/09/18 0819   • levoFLOXacin (LEVAQUIN) tablet 750 mg  750 mg DAILY Deis Chavira M.D.   750 mg at 03/09/18 0819   • NS infusion   Continuous Sergei Bar M.D. 83 mL/hr at 03/09/18 1123     • acetaminophen (TYLENOL) tablet 650 mg  650 mg Q6HRS PRN Sergei Bar M.D.       • cloNIDine (CATAPRES) tablet 0.1 mg  0.1 mg Q6HRS PRN Sergei Bar M.D.       • ondansetron (ZOFRAN) syringe/vial injection 4 mg  4 mg Q4HRS PRN Sergei Bar M.D.       • ondansetron (ZOFRAN ODT) dispertab 4 mg  4 mg Q4HRS PRN Sergei Bar M.D.       • promethazine (PHENERGAN) tablet 12.5-25 mg  12.5-25 mg Q4HRS PRBERNA Bar M.D.       • promethazine (PHENERGAN) suppository 12.5-25 mg  12.5-25 mg Q4HRS PRBERNA Bar M.D.       • prochlorperazine (COMPAZINE) injection 5-10 mg  5-10 mg Q4HRS PRN Sergei Bar M.D.       • senna-docusate (PERICOLACE or SENOKOT S) 8.6-50 MG per tablet 2 Tab  2 Tab BID Sergei Bar M.D.   Stopped at 03/08/18 2343    And   • polyethylene glycol/lytes (MIRALAX) PACKET 1 Packet  1 Packet QDAY PRN Sergei Bar M.D.        And   • magnesium hydroxide (MILK OF MAGNESIA) suspension 30 mL  30 mL QDAY PRN Sergei Bar M.D.        And   • bisacodyl (DULCOLAX) suppository 10 mg  10 mg QDAY PRN Sergei Bar M.D.       • zolpidem (AMBIEN) tablet 5 mg  5 mg HS PRN - MR X 1 Sergei Bar M.D.       • Respiratory Care per Protocol   Continuous RT Sergei Bar M.D.       • nitroglycerin (NITROSTAT) tablet 0.4 mg  0.4 mg Q5 MIN PRN Sergei Bar M.D.       • morphine (pf) 4 mg/ml injection 2-4 mg  2-4 mg Q5 MIN PRN Sergei Bar M.D.   4 mg at 03/08/18 2429   • metoprolol (LOPRESSOR) tablet 25 mg  25 mg TWICE DAILY Sergei Bar M.D.   25 mg at 03/09/18 0820   • lisinopril (PRINIVIL) 10 MG tablet 5 mg  5 mg Q DAY Sergei Bar M.D.    5 mg at 03/09/18 0819   • atorvastatin (LIPITOR) tablet 40 mg  40 mg Q EVENING Sergei Bar M.D.   Stopped at 03/08/18 1663   • aspirin (ASA) tablet 325 mg  325 mg DAILY Sergei Bar M.D.   325 mg at 03/09/18 0819    Or   • aspirin (ASA) chewable tab 324 mg  324 mg DAILY Sergei Bar M.D.        Or   • aspirin (ASA) suppository 300 mg  300 mg DAILY Sergei Bar M.D.       • magnesium hydroxide (MILK OF MAGNESIA) suspension 30 mL  30 mL Q4HRS PRN Sergei Bar M.D.         Last reviewed on 3/8/2018 11:47 PM by Tiara Thurston R.N.  Medications reviewed      Imaging reviewed    ECHO (Pending):  No results found for this or any previous visit.       Impressions and Recommendations:  Active Hospital Problems    Diagnosis   • NSTEMI (non-ST elevated myocardial infarction) (CMS-HCC) [I21.4]   • CAP (community acquired pneumonia) [J18.9]   • Overweight [E66.3]       1. Atypical chest pain    - Atypical chest pain with associated elevated troponin.   - Troponin level trending down.    - Pending MPI and echo.   - Continue with Aspirin, Atorvastatin and Metoprolol.     2.Elevated serum troponin   - Troponin level peaked at 0.72, currently trending down.    - Likely demand ischemia.   - Awaiting nuclear stress test     3.CAP    - Antibiotics as per primary team      4.Obesity       Thank for you allowing us to take part in your patient's care, please call should you have any questions or would like to discuss this patient.

## 2018-03-10 NOTE — PROGRESS NOTES
"Cardiology Progress Note                                      Admit Date: 3/8/2018  Resident(s): Zack Millan    Date & Time:   3/10/2018   9:22 AM       Patient ID:    Name:             Sriram Carlton     YOB: 1975  Age:                 42 y.o.  male   MRN:               [id number]    HPI:  Sriram Carlton is a 42 y.o. With no significant PMHx who was transferred down from [facility] with atypical chest pain.     Reason for consult:  - Elevated serum troponin.     Interval History:  - VSS NSR  - Pt acute complaints, no cp,sob,moreno,palpitaions,fever/cough,melena/blood in stool  - normal MPI, Echo     Review of Systems   Constitutional: Negative for diaphoresis, fever and malaise/fatigue.   Respiratory: Negative for cough, sputum production and shortness of breath.    Cardiovascular: Negative for chest pain, palpitations, leg swelling and PND.   Gastrointestinal: Negative for abdominal pain, blood in stool and melena.   Neurological: Negative for dizziness, weakness and headaches.       Physical Exam   Blood pressure 121/76, pulse 74, temperature 36.7 °C (98 °F), resp. rate 18, height 1.88 m (6' 2\"), weight 102.8 kg (226 lb 10.1 oz), SpO2 97 %.  Constitutional: He appears well-developed.   HENT: Normocephalic and atraumatic. No scleral icterus.   Neck: No JVD present.   Cardiovascular: Normal rate. Exam reveals no gallop and no friction rub. No murmur heard.   Pulmonary/Chest: CTAB.   Abdominal: S/NT/ND BS+   Musculoskeletal: Exhibits no edema. Pulses present.  Skin: Skin is warm and dry.       Fluids:    Intake/Output Summary (Last 24 hours) at 03/09/18 1632  Last data filed at 03/09/18 1600   Gross per 24 hour   Intake              120 ml   Output             2250 ml   Net            -2130 ml                Body mass index is 29.1 kg/m².    Recent Labs      03/09/18   0219   SODIUM  137   POTASSIUM  4.1   CHLORIDE  108   CO2  21   BUN  11   CREATININE  0.97   CALCIUM  8.8         Recent Labs "      03/09/18 0219   WBC  10.5   RBC  4.16*   HEMOGLOBIN  13.0*   HEMATOCRIT  39.1*   MCV  94.0   MCH  31.3   MCHC  33.2*   RDW  45.0   PLATELETCT  249   MPV  9.9     Recent Labs      03/09/18 0219   SODIUM  137   POTASSIUM  4.1   CHLORIDE  108   CO2  21   GLUCOSE  106*   BUN  11   CREATININE  0.97   CALCIUM  8.8         Recent Labs      03/08/18 1944   BNPBTYPENAT  44     Recent Labs      03/08/18 1944 03/09/18 0219 03/09/18   0636  03/10/18   0201   TROPONINI  0.72*  0.45*  0.29*  0.09*   BNPBTYPENAT  44   --    --    --      Recent Labs      03/09/18 0219   TRIGLYCERIDE  165*   HDL  19*   LDL  98       Meds:  • enoxaparin (LOVENOX) injection  40 mg     • levoFLOXacin  750 mg     • NS   83 mL/hr at 03/10/18 0016   • acetaminophen  650 mg     • cloNIDine  0.1 mg     • ondansetron  4 mg     • ondansetron  4 mg     • promethazine  12.5-25 mg     • promethazine  12.5-25 mg     • prochlorperazine  5-10 mg     • senna-docusate  2 Tab      And   • polyethylene glycol/lytes  1 Packet      And   • magnesium hydroxide  30 mL      And   • bisacodyl  10 mg     • zolpidem  5 mg     • Respiratory Care per Protocol       • nitroglycerin  0.4 mg     • morphine injection  2-4 mg     • metoprolol  25 mg     • lisinopril  5 mg     • atorvastatin  40 mg     • aspirin  325 mg      Or   • aspirin  324 mg      Or   • aspirin  300 mg     • magnesium hydroxide  30 mL         Current Facility-Administered Medications   Medication Dose Frequency Provider Last Rate Last Dose   • enoxaparin (LOVENOX) inj 40 mg  40 mg DAILY Sergei Bar M.D.   40 mg at 03/10/18 0823   • levoFLOXacin (LEVAQUIN) tablet 750 mg  750 mg DAILY Desi Chavira M.D.   750 mg at 03/10/18 0823   • NS infusion   Continuous Sergei Bar M.D. 83 mL/hr at 03/10/18 0016     • acetaminophen (TYLENOL) tablet 650 mg  650 mg Q6HRS PRN Sergei Bar M.D.       • cloNIDine (CATAPRES) tablet 0.1 mg  0.1 mg Q6HRS PRN Sergei Bar M.D.       • ondansetron  (ZOFRAN) syringe/vial injection 4 mg  4 mg Q4HRS PRN Sergei Bar M.D.       • ondansetron (ZOFRAN ODT) dispertab 4 mg  4 mg Q4HRS PRN Sergei Bar M.D.       • promethazine (PHENERGAN) tablet 12.5-25 mg  12.5-25 mg Q4HRS PRN Sergei Bar M.D.       • promethazine (PHENERGAN) suppository 12.5-25 mg  12.5-25 mg Q4HRS PRN Sergei Bar M.D.       • prochlorperazine (COMPAZINE) injection 5-10 mg  5-10 mg Q4HRS PRN Sergei Bar M.D.       • senna-docusate (PERICOLACE or SENOKOT S) 8.6-50 MG per tablet 2 Tab  2 Tab BID Sergei Bar M.D.   Stopped at 03/08/18 2343    And   • polyethylene glycol/lytes (MIRALAX) PACKET 1 Packet  1 Packet QDAY PRN Sergei Bar M.D.        And   • magnesium hydroxide (MILK OF MAGNESIA) suspension 30 mL  30 mL QDAY PRN Sergei Bar M.D.        And   • bisacodyl (DULCOLAX) suppository 10 mg  10 mg QDAY PRN Sergei Bar M.D.       • zolpidem (AMBIEN) tablet 5 mg  5 mg HS PRN - MR X 1 Sergei Bar M.D.       • Respiratory Care per Protocol   Continuous RT Sergei Bar M.D.       • nitroglycerin (NITROSTAT) tablet 0.4 mg  0.4 mg Q5 MIN PRN Sergei Bar M.D.       • morphine (pf) 4 mg/ml injection 2-4 mg  2-4 mg Q5 MIN PRN Sergei Bar M.D.   4 mg at 03/08/18 2359   • metoprolol (LOPRESSOR) tablet 25 mg  25 mg TWICE DAILY Sergei Bar M.D.   25 mg at 03/10/18 0823   • lisinopril (PRINIVIL) 10 MG tablet 5 mg  5 mg Q DAY Sergei Bar M.D.   5 mg at 03/10/18 0823   • atorvastatin (LIPITOR) tablet 40 mg  40 mg Q EVENING Sergei Bar M.D.   40 mg at 03/09/18 2034   • aspirin (ASA) tablet 325 mg  325 mg DAILY Sergei Bar M.D.   325 mg at 03/10/18 0823    Or   • aspirin (ASA) chewable tab 324 mg  324 mg DAILY Sergei Bar M.D.        Or   • aspirin (ASA) suppository 300 mg  300 mg DAILY Sergei Bar M.D.       • magnesium hydroxide (MILK OF MAGNESIA) suspension 30 mL  30 mL Q4HRS PRN Sergei Bar M.D.         Last reviewed on 3/8/2018 11:47 PM by  Tiara Thurston R.N.  Medications reviewed      Imaging reviewed    ECHO (Pending):  Results for orders placed or performed during the hospital encounter of 03/08/18   Echocardiogram Comp W/O Cont   Result Value Ref Range    Eject.Frac. MOD BP 55.5     Eject.Frac. MOD 4C 53.97     Eject.Frac. MOD 2C 54.83     Left Ventrical Ejection Fraction 60           Impressions and Recommendations:  Active Hospital Problems    Diagnosis   • NSTEMI (non-ST elevated myocardial infarction) (CMS-[facility]) [I21.4]   • CAP (community acquired pneumonia) [J18.9]   • Overweight [E66.3]       1. Atypical chest pain   - Atypical chest pain with associated elevated troponin.   - Troponin level trending down - negative MPI and echo.   - Continue with Aspirin, Atorvastatin and Metoprolol for now   - would rec'd patient f/u PCP- consider CT Coronary calcium scoring as outpatient for long term risk stratification to guide medication management. D/w patient.   - ok to Dc from cardiology standpoint    2.Elevated serum troponin   - Troponin level peaked at 0.72, currently trending down.    - Likely demand ischemia.   - negative nuclear stress test     3.CAP    - Antibiotics as per primary team      4.Obesity       Thank for you allowing us to take part in your patient's care, please call should you have any questions or would like to discuss this patient.

## 2018-03-10 NOTE — PROGRESS NOTES
Discharge Instructions    Discharged to home by car with relative. Discharged via walking, hospital escort: Refused.  Special equipment needed: Not Applicable    Be sure to schedule a follow-up appointment with your primary care doctor or any specialists as instructed.     Discharge Plan:   Diet Plan: Discussed  Activity Level: Discussed  Confirmed Follow up Appointment: Patient to Call and Schedule Appointment  Confirmed Symptoms Management: Discussed  Medication Reconciliation Updated: Yes  Influenza Vaccine Indication: Not indicated: Previously immunized this influenza season and > 8 years of age    I understand that a diet low in cholesterol, fat, and sodium is recommended for good health. Unless I have been given specific instructions below for another diet, I accept this instruction as my diet prescription.   Other diet: Heart healthy    Special Instructions: None    · Is patient discharged on Warfarin / Coumadin?   No

## 2018-03-10 NOTE — DISCHARGE SUMMARY
"CHIEF COMPLAINT ON ADMISSION  Chief Complaint   Patient presents with   • Chest Pain   • Abnormal Labs       CODE STATUS  Full Code    HPI & HOSPITAL COURSE  This is a 42 y.o. male here with chest pain and abnormal troponin, one week after being treated with azithromycin for presumed pneumonia. He felt that he was recovering and went to his office to do a little work but developed severe pain in his left clavicular area \"like the bone had suddenly broken\". He was evaluated at [facility] where a slightly elevated troponin was found but with normal EKG and normal CT-PE study. Patient was transferred here for further evaluation. Cardiology was consulted and recommended an echocardiogram and myocardial perfusion imaging study, both of which were unremarkable (there was deemed to be artifact on the MPI study). Procalcitonin level was still elevated, thus he was given a course of oral levofloxacin and was improving clinically at the time of discharge. Recommend total 10 day course of levaquin and follow up with his local primary care and cardiologist for another echo and ekg in the next month or so. Cardiology recommended medical therapy with statin, beta blocker, aspirin and ACE inhibitor at this time pending further workup.    The patient met 2-midnight criteria for an inpatient stay at the time of discharge.    Therefore, he is discharged in good and stable condition with close outpatient follow-up.    SPECIFIC OUTPATIENT FOLLOW-UP  Primary care in [place] for follow up with local cardiologist.    DISCHARGE PROBLEM LIST  Principal Problem:    NSTEMI (non-ST elevated myocardial infarction) (CMS-[facility]) POA: Unknown  Active Problems:    CAP (community acquired pneumonia) POA: Unknown    Overweight POA: Unknown  Resolved Problems:    * No resolved hospital problems. *      FOLLOW UP  No future appointments.  No follow-up provider specified.    MEDICATIONS ON DISCHARGE        Sriram Carlton   Home Medication " Instructions ROBI:[id number]    Printed on:03/10/18 1243   Medication Information                      aspirin EC 81 MG EC tablet  Take 1 Tab by mouth every day.             atorvastatin (LIPITOR) 20 MG Tab  Take 1 Tab by mouth every evening.             levoFLOXacin (LEVAQUIN) 750 MG tablet  Take 1 Tab by mouth every day.             lisinopril (PRINIVIL) 5 MG Tab  Take 1 Tab by mouth every day.             metoprolol (LOPRESSOR) 25 MG Tab  Take 0.5 Tabs by mouth 2 Times a Day.             valACYclovir (VALTREX) 500 MG Tab  Take 500 mg by mouth 2 times a day. 3 day course FINISHED                   DIET  Orders Placed This Encounter   Procedures   • DIET ORDER     Standing Status:   Standing     Number of Occurrences:   1     Order Specific Question:   Diet:     Answer:   Regular [1]     Order Specific Question:   Miscellaneous modifications:     Answer:   No Decaf, No Caffeine(for test) [11]     Comments:   Protocol 1313 Patient to have no caffeine for 12 hours prior to exam (decaf, coffee, cola, tea, chocolate)       ACTIVITY  As tolerated.  Weight bearing as tolerated      CONSULTATIONS  Cardiology.    PROCEDURES  None.    LABORATORY  Lab Results   Component Value Date/Time    SODIUM 137 03/09/2018 02:19 AM    POTASSIUM 4.1 03/09/2018 02:19 AM    CHLORIDE 108 03/09/2018 02:19 AM    CO2 21 03/09/2018 02:19 AM    GLUCOSE 106 (H) 03/09/2018 02:19 AM    BUN 11 03/09/2018 02:19 AM    CREATININE 0.97 03/09/2018 02:19 AM        Lab Results   Component Value Date/Time    WBC 10.5 03/09/2018 02:19 AM    HEMOGLOBIN 13.0 (L) 03/09/2018 02:19 AM    HEMATOCRIT 39.1 (L) 03/09/2018 02:19 AM    PLATELETCT 249 03/09/2018 02:19 AM        Total time of the discharge process exceeds 32 minutes

## 2018-03-10 NOTE — DISCHARGE INSTRUCTIONS
Discharge Instructions    Discharged to home by car with relative. Discharged via walking, hospital escort: Refused.  Special equipment needed: Not Applicable    Be sure to schedule a follow-up appointment with your primary care doctor or any specialists as instructed.     Discharge Plan:   Diet Plan: Discussed  Activity Level: Discussed  Confirmed Follow up Appointment: Patient to Call and Schedule Appointment  Confirmed Symptoms Management: Discussed  Medication Reconciliation Updated: Yes  Influenza Vaccine Indication: Not indicated: Previously immunized this influenza season and > 8 years of age    I understand that a diet low in cholesterol, fat, and sodium is recommended for good health. Unless I have been given specific instructions below for another diet, I accept this instruction as my diet prescription.   Other diet: Heart healthy    Special Instructions: None    · Is patient discharged on Warfarin / Coumadin?   No     Depression / Suicide Risk    As you are discharged from this [facility] facility, it is important to learn how to keep safe from harming yourself.    Recognize the warning signs:  · Abrupt changes in personality, positive or negative- including increase in energy   · Giving away possessions  · Change in eating patterns- significant weight changes-  positive or negative  · Change in sleeping patterns- unable to sleep or sleeping all the time   · Unwillingness or inability to communicate  · Depression  · Unusual sadness, discouragement and loneliness  · Talk of wanting to die  · Neglect of personal appearance   · Rebelliousness- reckless behavior  · Withdrawal from people/activities they love  · Confusion- inability to concentrate     If you or a loved one observes any of these behaviors or has concerns about self-harm, here's what you can do:  · Talk about it- your feelings and reasons for harming yourself  · Remove any means that you might use to hurt yourself (examples: pills, rope,  extension cords, firearm)  · Get professional help from the community (Mental Health, Substance Abuse, psychological counseling)  · Do not be alone:Call your Safe Contact- someone whom you trust who will be there for you.  · Call your local CRISIS HOTLINE 772-9358 or 258-660-0679  · Call your local Children's Mobile Crisis Response Team [place] (690) 230-4487 or www.[employer]  · Call the toll free National Suicide Prevention Hotlines   · National Suicide Prevention Lifeline 948-912-DUVY (7954)  · [place] [facility] 800-SUICIDE (851-0171)      Aspirin, ASA oral tablets  What is this medicine?  ASPIRIN (AS pir in) is a pain reliever. It is used to treat mild pain and fever. This medicine is also used as directed by a doctor to prevent and to treat heart attacks, to prevent strokes, and to treat arthritis or inflammation.  This medicine may be used for other purposes; ask your health care provider or pharmacist if you have questions.  COMMON BRAND NAME(S): Aspir-Low, Aspir-Selena, Aspirtab, Audra Advanced Aspirin, Audra Aspirin, Audra Aspirin Extra Strength, Audra Aspirin Plus, Audra Extra Strength, Audra Extra Strength Plus, Audra Genuine Aspirin, Audra Womens Aspirin, Bufferin, Bufferin Extra Strength, Bufferin Low Dose  What should I tell my health care provider before I take this medicine?  They need to know if you have any of these conditions:  -anemia  -asthma  -bleeding problems  -child with chickenpox, the flu, or other viral infection  -diabetes  -gout  -if you frequently drink alcohol containing drinks  -kidney disease  -liver disease  -low level of vitamin K  -lupus  -smoke tobacco  -stomach ulcers or other problems  -an unusual or allergic reaction to aspirin, tartrazine dye, other medicines, dyes, or preservatives  -pregnant or trying to get pregnant  -breast-feeding  How should I use this medicine?  Take this medicine by mouth with a glass of water. Follow the directions on the package  or prescription label. You can take this medicine with or without food. If it upsets your stomach, take it with food. Do not take your medicine more often than directed.  Talk to your pediatrician regarding the use of this medicine in children. While this drug may be prescribed for children as young as 12 years of age for selected conditions, precautions do apply. Children and teenagers should not use this medicine to treat chicken pox or flu symptoms unless directed by a doctor.  Patients over 65 years old may have a stronger reaction and need a smaller dose.  Overdosage: If you think you have taken too much of this medicine contact a poison control center or emergency room at once.  NOTE: This medicine is only for you. Do not share this medicine with others.  What if I miss a dose?  If you are taking this medicine on a regular schedule and miss a dose, take it as soon as you can. If it is almost time for your next dose, take only that dose. Do not take double or extra doses.  What may interact with this medicine?  Do not take this medicine with any of the following medications:  -cidofovir  -ketorolac  -probenecid  This medicine may also interact with the following medications:  -alcohol  -alendronate  -bismuth subsalicylate  -flavocoxid  -herbal supplements like feverfew, garlic, alan, ginkgo biloba, horse chestnut  -medicines for diabetes or glaucoma like acetazolamide, methazolamide  -medicines for gout  -medicines that treat or prevent blood clots like enoxaparin, heparin, ticlopidine, warfarin  -other aspirin and aspirin-like medicines  -NSAIDs, medicines for pain and inflammation, like ibuprofen or naproxen  -pemetrexed  -sulfinpyrazone  -varicella live vaccine  This list may not describe all possible interactions. Give your health care provider a list of all the medicines, herbs, non-prescription drugs, or dietary supplements you use. Also tell them if you smoke, drink alcohol, or use illegal drugs. Some  items may interact with your medicine.  What should I watch for while using this medicine?  If you are treating yourself for pain, tell your doctor or health care professional if the pain lasts more than 10 days, if it gets worse, or if there is a new or different kind of pain. Tell your doctor if you see redness or swelling. Also, check with your doctor if you have a fever that lasts for more than 3 days. Only take this medicine to prevent heart attacks or blood clotting if prescribed by your doctor or health care professional.  Do not take aspirin or aspirin-like medicines with this medicine. Too much aspirin can be dangerous. Always read the labels carefully.  This medicine can irritate your stomach or cause bleeding problems. Do not smoke cigarettes or drink alcohol while taking this medicine. Do not lie down for 30 minutes after taking this medicine to prevent irritation to your throat.  If you are scheduled for any medical or dental procedure, tell your healthcare provider that you are taking this medicine. You may need to stop taking this medicine before the procedure.  This medicine may be used to treat migraines. If you take migraine medicines for 10 or more days a month, your migraines may get worse. Keep a diary of headache days and medicine use. Contact your healthcare professional if your migraine attacks occur more frequently.  What side effects may I notice from receiving this medicine?  Side effects that you should report to your doctor or health care professional as soon as possible:  -allergic reactions like skin rash, itching or hives, swelling of the face, lips, or tongue  -breathing problems  -changes in hearing, ringing in the ears  -confusion  -general ill feeling or flu-like symptoms  -pain on swallowing  -redness, blistering, peeling or loosening of the skin, including inside the mouth or nose  -signs and symptoms of bleeding such as bloody or black, tarry stools; red or dark-brown urine;  spitting up blood or brown material that looks like coffee grounds; red spots on the skin; unusual bruising or bleeding from the eye, gums, or nose  -trouble passing urine or change in the amount of urine  -unusually weak or tired  -yellowing of the eyes or skin  Side effects that usually do not require medical attention (report to your doctor or health care professional if they continue or are bothersome):  -diarrhea or constipation  -headache  -nausea, vomiting  -stomach gas, heartburn  This list may not describe all possible side effects. Call your doctor for medical advice about side effects. You may report side effects to FDA at 0-494-CLI-3597.  Where should I keep my medicine?  Keep out of the reach of children.  Store at room temperature between 15 and 30 degrees C (59 and 86 degrees F). Protect from heat and moisture. Do not use this medicine if it has a strong vinegar smell. Throw away any unused medicine after the expiration date.  NOTE: This sheet is a summary. It may not cover all possible information. If you have questions about this medicine, talk to your doctor, pharmacist, or health care provider.  © 2018 [employer] Standard (2014-08-19 11:30:31)    Lisinopril tablets  What is this medicine?  LISINOPRIL (lyse IN oh pril) is an ACE inhibitor. This medicine is used to treat high blood pressure and heart failure. It is also used to protect the heart immediately after a heart attack.  This medicine may be used for other purposes; ask your health care provider or pharmacist if you have questions.  COMMON BRAND NAME(S): Prinivil, Zestril  What should I tell my health care provider before I take this medicine?  They need to know if you have any of these conditions:  -diabetes  -heart or blood vessel disease  -kidney disease  -low blood pressure  -previous swelling of the tongue, face, or lips with difficulty breathing, difficulty swallowing, hoarseness, or tightening of the throat  -an unusual or  allergic reaction to lisinopril, other ACE inhibitors, insect venom, foods, dyes, or preservatives  -pregnant or trying to get pregnant  -breast-feeding  How should I use this medicine?  Take this medicine by mouth with a glass of water. Follow the directions on your prescription label. You may take this medicine with or without food. If it upsets your stomach, take it with food. Take your medicine at regular intervals. Do not take it more often than directed. Do not stop taking except on your doctor's advice.  Talk to your pediatrician regarding the use of this medicine in children. Special care may be needed. While this drug may be prescribed for children as young as 6 years of age for selected conditions, precautions do apply.  Overdosage: If you think you have taken too much of this medicine contact a poison control center or emergency room at once.  NOTE: This medicine is only for you. Do not share this medicine with others.  What if I miss a dose?  If you miss a dose, take it as soon as you can. If it is almost time for your next dose, take only that dose. Do not take double or extra doses.  What may interact with this medicine?  Do not take this medicine with any of the following medications:  -hymenoptera venom  -sacubitril; valsartan  This medicines may also interact with the following medications:  -aliskiren  -angiotensin receptor blockers, like losartan or valsartan  -certain medicines for diabetes  -diuretics  -everolimus  -gold compounds  -lithium  -NSAIDs, medicines for pain and inflammation, like ibuprofen or naproxen  -potassium salts or supplements  -salt substitutes  -sirolimus  -temsirolimus  This list may not describe all possible interactions. Give your health care provider a list of all the medicines, herbs, non-prescription drugs, or dietary supplements you use. Also tell them if you smoke, drink alcohol, or use illegal drugs. Some items may interact with your medicine.  What should I watch  for while using this medicine?  Visit your doctor or health care professional for regular check ups. Check your blood pressure as directed. Ask your doctor what your blood pressure should be, and when you should contact him or her.  Do not treat yourself for coughs, colds, or pain while you are using this medicine without asking your doctor or health care professional for advice. Some ingredients may increase your blood pressure.  Women should inform their doctor if they wish to become pregnant or think they might be pregnant. There is a potential for serious side effects to an unborn child. Talk to your health care professional or pharmacist for more information.  Check with your doctor or health care professional if you get an attack of severe diarrhea, nausea and vomiting, or if you sweat a lot. The loss of too much body fluid can make it dangerous for you to take this medicine.  You may get drowsy or dizzy. Do not drive, use machinery, or do anything that needs mental alertness until you know how this drug affects you. Do not stand or sit up quickly, especially if you are an older patient. This reduces the risk of dizzy or fainting spells. Alcohol can make you more drowsy and dizzy. Avoid alcoholic drinks.  Avoid salt substitutes unless you are told otherwise by your doctor or health care professional.  What side effects may I notice from receiving this medicine?  Side effects that you should report to your doctor or health care professional as soon as possible:  -allergic reactions like skin rash, itching or hives, swelling of the hands, feet, face, lips, throat, or tongue  -breathing problems  -signs and symptoms of kidney injury like trouble passing urine or change in the amount of urine  -signs and symptoms of increased potassium like muscle weakness; chest pain; or fast, irregular heartbeat  -signs and symptoms of liver injury like dark yellow or brown urine; general ill feeling or flu-like symptoms;  light-colored stools; loss of appetite; nausea; right upper belly pain; unusually weak or tired; yellowing of the eyes or skin  -signs and symptoms of low blood pressure like dizziness; feeling faint or lightheaded, falls; unusually weak or tired  -stomach pain with or without nausea and vomiting  Side effects that usually do not require medical attention (report to your doctor or health care professional if they continue or are bothersome):  -changes in taste  -cough  -dizziness  -fever  -headache  -sensitivity to light  This list may not describe all possible side effects. Call your doctor for medical advice about side effects. You may report side effects to FDA at 2-758-FDA-3503.  Where should I keep my medicine?  Keep out of the reach of children.  Store at room temperature between 15 and 30 degrees C (59 and 86 degrees F). Protect from moisture. Keep container tightly closed. Throw away any unused medicine after the expiration date.  NOTE: This sheet is a summary. It may not cover all possible information. If you have questions about this medicine, talk to your doctor, pharmacist, or health care provider.  © 2018 [employer]/Gold Standard (2017-02-06 12:52:35)      Atorvastatin tablets  What is this medicine?  ATORVASTATIN (a TORE va sta tin) is known as a HMG-CoA reductase inhibitor or 'statin'. It lowers the level of cholesterol and triglycerides in the blood. This drug may also reduce the risk of heart attack, stroke, or other health problems in patients with risk factors for heart disease. Diet and lifestyle changes are often used with this drug.  This medicine may be used for other purposes; ask your health care provider or pharmacist if you have questions.  COMMON BRAND NAME(S): Lipitor  What should I tell my health care provider before I take this medicine?  They need to know if you have any of these conditions:  -frequently drink alcoholic beverages  -history of stroke, TIA  -kidney disease  -liver  disease  -muscle aches or weakness  -other medical condition  -an unusual or allergic reaction to atorvastatin, other medicines, foods, dyes, or preservatives  -pregnant or trying to get pregnant  -breast-feeding  How should I use this medicine?  Take this medicine by mouth with a glass of water. Follow the directions on the prescription label. You can take this medicine with or without food. Take your doses at regular intervals. Do not take your medicine more often than directed.  Talk to your pediatrician regarding the use of this medicine in children. While this drug may be prescribed for children as young as 10 years old for selected conditions, precautions do apply.  Overdosage: If you think you have taken too much of this medicine contact a poison control center or emergency room at once.  NOTE: This medicine is only for you. Do not share this medicine with others.  What if I miss a dose?  If you miss a dose, take it as soon as you can. If it is almost time for your next dose, take only that dose. Do not take double or extra doses.  What may interact with this medicine?  Do not take this medicine with any of the following medications:  -red yeast rice  -telaprevir  -telithromycin  -voriconazole  This medicine may also interact with the following medications:  -alcohol  -antiviral medicines for HIV or AIDS  -boceprevir  -certain antibiotics like clarithromycin, erythromycin, troleandomycin  -certain medicines for cholesterol like fenofibrate or gemfibrozil  -cimetidine  -clarithromycin  -colchicine  -cyclosporine  -digoxin  -female hormones, like estrogens or progestins and birth control pills  -grapefruit juice  -medicines for fungal infections like fluconazole, itraconazole, ketoconazole  -niacin  -rifampin  -spironolactone  This list may not describe all possible interactions. Give your health care provider a list of all the medicines, herbs, non-prescription drugs, or dietary supplements you use. Also tell  them if you smoke, drink alcohol, or use illegal drugs. Some items may interact with your medicine.  What should I watch for while using this medicine?  Visit your doctor or health care professional for regular check-ups. You may need regular tests to make sure your liver is working properly.  Tell your doctor or health care professional right away if you get any unexplained muscle pain, tenderness, or weakness, especially if you also have a fever and tiredness. Your doctor or health care professional may tell you to stop taking this medicine if you develop muscle problems. If your muscle problems do not go away after stopping this medicine, contact your health care professional.  This drug is only part of a total heart-health program. Your doctor or a dietician can suggest a low-cholesterol and low-fat diet to help. Avoid alcohol and smoking, and keep a proper exercise schedule.  Do not use this drug if you are pregnant or breast-feeding. Serious side effects to an unborn child or to an infant are possible. Talk to your doctor or pharmacist for more information.  This medicine may affect blood sugar levels. If you have diabetes, check with your doctor or health care professional before you change your diet or the dose of your diabetic medicine.  If you are going to have surgery tell your health care professional that you are taking this drug.  What side effects may I notice from receiving this medicine?  Side effects that you should report to your doctor or health care professional as soon as possible:  -allergic reactions like skin rash, itching or hives, swelling of the face, lips, or tongue  -dark urine  -fever  -joint pain  -muscle cramps, pain  -redness, blistering, peeling or loosening of the skin, including inside the mouth  -trouble passing urine or change in the amount of urine  -unusually weak or tired  -yellowing of eyes or skin  Side effects that usually do not require medical attention (report to your  doctor or health care professional if they continue or are bothersome):  -constipation  -heartburn  -stomach gas, pain, upset  This list may not describe all possible side effects. Call your doctor for medical advice about side effects. You may report side effects to FDA at 3-978-BGM-2382.  Where should I keep my medicine?  Keep out of the reach of children.  Store at room temperature between 20 to 25 degrees C (68 to 77 degrees F). Throw away any unused medicine after the expiration date.  NOTE: This sheet is a summary. It may not cover all possible information. If you have questions about this medicine, talk to your doctor, pharmacist, or health care provider.  © 2018 [employer]/Gold Standard (2012-11-06 09:18:24)      Community-Acquired Pneumonia, Adult  Pneumonia is an infection of the lungs. There are different types of pneumonia. One type can develop while a person is in a hospital. A different type, called community-acquired pneumonia, develops in people who are not, or have not recently been, in the hospital or other health care facility.  What are the causes?  Pneumonia may be caused by bacteria, viruses, or funguses. Community-acquired pneumonia is often caused by Streptococcus pneumonia bacteria. These bacteria are often passed from one person to another by breathing in droplets from the cough or sneeze of an infected person.  What increases the risk?  The condition is more likely to develop in:  · People who have chronic diseases, such as chronic obstructive pulmonary disease (COPD), asthma, congestive heart failure, cystic fibrosis, diabetes, or kidney disease.  · People who have early-stage or late-stage HIV.  · People who have sickle cell disease.  · People who have had their spleen removed (splenectomy).  · People who have poor dental hygiene.  · People who have medical conditions that increase the risk of breathing in (aspirating) secretions their own mouth and nose.  · People who have a weakened  immune system (immunocompromised).  · People who smoke.  · People who travel to areas where pneumonia-causing germs commonly exist.  · People who are around animal habitats or animals that have pneumonia-causing germs, including birds, bats, rabbits, cats, and farm animals.  What are the signs or symptoms?  Symptoms of this condition include:  · A dry cough.  · A wet (productive) cough.  · Fever.  · Sweating.  · Chest pain, especially when breathing deeply or coughing.  · Rapid breathing or difficulty breathing.  · Shortness of breath.  · Shaking chills.  · Fatigue.  · Muscle aches.  How is this diagnosed?  Your health care provider will take a medical history and perform a physical exam. You may also have other tests, including:  · Imaging studies of your chest, including X-rays.  · Tests to check your blood oxygen level and other blood gases.  · Other tests on blood, mucus (sputum), fluid around your lungs (pleural fluid), and urine.  If your pneumonia is severe, other tests may be done to identify the specific cause of your illness.  How is this treated?  The type of treatment that you receive depends on many factors, such as the cause of your pneumonia, the medicines you take, and other medical conditions that you have. For most adults, treatment and recovery from pneumonia may occur at home. In some cases, treatment must happen in a hospital. Treatment may include:  · Antibiotic medicines, if the pneumonia was caused by bacteria.  · Antiviral medicines, if the pneumonia was caused by a virus.  · Medicines that are given by mouth or through an IV tube.  · Oxygen.  · Respiratory therapy.  Although rare, treating severe pneumonia may include:  · Mechanical ventilation. This is done if you are not breathing well on your own and you cannot maintain a safe blood oxygen level.  · Thoracentesis. This procedure removes fluid around one lung or both lungs to help you breathe better.  Follow these instructions at  home:  · Take over-the-counter and prescription medicines only as told by your health care provider.  ¨ Only take cough medicine if you are losing sleep. Understand that cough medicine can prevent your body’s natural ability to remove mucus from your lungs.  ¨ If you were prescribed an antibiotic medicine, take it as told by your health care provider. Do not stop taking the antibiotic even if you start to feel better.  · Sleep in a semi-upright position at night. Try sleeping in a reclining chair, or place a few pillows under your head.  · Do not use tobacco products, including cigarettes, chewing tobacco, and e-cigarettes. If you need help quitting, ask your health care provider.  · Drink enough water to keep your urine clear or pale yellow. This will help to thin out mucus secretions in your lungs.  How is this prevented?  There are ways that you can decrease your risk of developing community-acquired pneumonia. Consider getting a pneumococcal vaccine if:  · You are older than 65 years of age.  · You are older than 19 years of age and are undergoing cancer treatment, have chronic lung disease, or have other medical conditions that affect your immune system. Ask your health care provider if this applies to you.  There are different types and schedules of pneumococcal vaccines. Ask your health care provider which vaccination option is best for you.  You may also prevent community-acquired pneumonia if you take these actions:  · Get an influenza vaccine every year. Ask your health care provider which type of influenza vaccine is best for you.  · Go to the dentist on a regular basis.  · Wash your hands often. Use hand  if soap and water are not available.  Contact a health care provider if:  · You have a fever.  · You are losing sleep because you cannot control your cough with cough medicine.  Get help right away if:  · You have worsening shortness of breath.  · You have increased chest pain.  · Your sickness  becomes worse, especially if you are an older adult or have a weakened immune system.  · You cough up blood.  This information is not intended to replace advice given to you by your health care provider. Make sure you discuss any questions you have with your health care provider.  Document Released: 12/18/2006 Document Revised: 04/27/2017 Document Reviewed: 04/13/2016  [employer] Patient Education © 2017 [employer]

## 2018-03-20 NOTE — ADDENDUM NOTE
Encounter addended by: Joceline Santana on: 3/19/2018  6:49 PM<BR>    Actions taken: Pend clinical note

## 2018-03-20 NOTE — DOCUMENTATION QUERY
"DOCUMENTATION QUERY    PROVIDERS: Please select “Cosign w/ note”to reply to query.    To better represent the severity of illness of your patient, please review the following information and exercise your independent professional judgment in responding to this query.     Patient with a recent history of pneumonia was admitted as a transfer with complaints of chest pain and elevated troponin.     NSTEMI is documented per H&P and Cardiology progress notes with \"Nuclear stress test negative for evidence of ischemia.\"    Atypical chest pain is documented per Cardiology consult.  Elevated serum troponin is documented as likely demand ischemia on PN  Last Hospitalist progress note on 3/9/18 documents possible \"Takotsubo type syndrome.\"   Discharge Summary Hospital course documents \"echocardiogram and myocardial perfusion imaging study, both of which were unremarkable (there was deemed to be artifact on the MPI study). However, NSTEMI is listed as a discharge diagnosis.     Based upon the clinical findings, risk factors, and treatment, can the cause of patient's chest pain be clarified?      Please specify which applies:      • Chest pain due to NSTEMI   • Chest pain due to demand ischemia  • Chest pain due to demand ischemia with NSTEMI  • Chest pain due to Takotsubo syndrome  • Atypical or Noncardiac chest pain     • Other explanation of clinical findings (please document)  • Unable to determine      The medical record reflects the following:    Clinical Findings  H&P 3/8/19   * NSTEMI (non-ST elevated myocardial infarction) (CMS-HCC)   Assessment & Plan     With elevated troponin, ongoing chest pain.  Given hypoxia and tachycardia also concern for possible PE,  However negative study at outside facility for the same.  Will trend troponin and appreciate cardiology recommendations.        Cardiology Consult 3/8/18, Sriram Stone MD    3.  Atypical chest pain.  4.  Borderline troponins.      PN 3/9/18, Desi Chavira, " MD  Assessment/Plan       * NSTEMI (non-ST elevated myocardial infarction) (CMS-HCC)   Assessment & Plan     With elevated troponin, improved but still elevated   Hypoxia has improved.  Continue to monitor troponin.  Nuclear stress test is negative for evidence of ischemia. Need echocardiogram to rule out other causes of elevated troponin, no evidence of pericarditis on EKG or lab work other than elevated troponin.          Physiological stress from the pneumonia may have caused the increase in the troponin and perhaps may have caused a Takotsubo type syndrome.      Discharge Summary 3/10/18, Desi Chavira MD     HPI & HOSPITAL COURSE  This is a 42 y.o. male here with chest pain and abnormal troponin, one week after being treated with azithromycin for presumed pneumonia.... He was evaluated at [facility] where a slightly elevated troponin was found but with normal EKG and normal CT-PE study. Patient was transferred here for further evaluation.    Cardiology was consulted and recommended an echocardiogram and myocardial perfusion imaging study, both of which were unremarkable (there was deemed to be artifact on the MPI study).       DISCHARGE PROBLEM LIST  Principal Problem:    NSTEMI (non-ST elevated myocardial infarction) (CMS-HCC) POA: Unknown       Treatment  Cardio consult, Nuclear stress test, ECHO, trend troponin   Risk Factors   pneumonia, chest pain with elevated troponin, overweight   Location within medical record   H&P, Consult, Progress notes, D/S      Thank you,    [email or web address]

## 2018-03-22 NOTE — ADDENDUM NOTE
Encounter addended by: Joceline Santana on: 3/21/2018  6:23 PM<BR>    Actions taken: Sign clinical note

## 2018-03-23 NOTE — ADDENDUM NOTE
Encounter addended by: Desi Chavira M.D. on: 3/23/2018  7:51 AM<BR>    Actions taken: Cosign clinical note with attestation